# Patient Record
Sex: MALE | Race: WHITE
[De-identification: names, ages, dates, MRNs, and addresses within clinical notes are randomized per-mention and may not be internally consistent; named-entity substitution may affect disease eponyms.]

---

## 2019-07-07 ENCOUNTER — HOSPITAL ENCOUNTER (EMERGENCY)
Dept: HOSPITAL 92 - ERS | Age: 59
Discharge: ANOTHER HOSPITAL | End: 2019-07-07
Payer: SELF-PAY

## 2019-07-07 DIAGNOSIS — J96.00: Primary | ICD-10-CM

## 2019-07-07 DIAGNOSIS — I71.01: ICD-10-CM

## 2019-07-07 LAB
ALBUMIN SERPL BCG-MCNC: 3.8 G/DL (ref 3.5–5)
ALP SERPL-CCNC: 58 U/L (ref 40–150)
ALT SERPL W P-5'-P-CCNC: 14 U/L (ref 8–55)
ANALYZER IN CARDIO: (no result)
ANION GAP SERPL CALC-SCNC: 16 MMOL/L (ref 10–20)
AST SERPL-CCNC: 18 U/L (ref 5–34)
BACTERIA UR QL AUTO: (no result) HPF
BASE EXCESS STD BLDA CALC-SCNC: -2.4 MEQ/L
BASOPHILS # BLD AUTO: 0 THOU/UL (ref 0–0.2)
BASOPHILS NFR BLD AUTO: 0.5 % (ref 0–1)
BILIRUB SERPL-MCNC: 0.5 MG/DL (ref 0.2–1.2)
BUN SERPL-MCNC: 24 MG/DL (ref 8.4–25.7)
CA-I BLDA-SCNC: 1.12 MMOL/L (ref 1.12–1.3)
CALCIUM SERPL-MCNC: 8.9 MG/DL (ref 7.8–10.44)
CHLORIDE SERPL-SCNC: 107 MMOL/L (ref 98–107)
CO2 SERPL-SCNC: 21 MMOL/L (ref 22–29)
CREAT CL PREDICTED SERPL C-G-VRATE: 0 ML/MIN (ref 70–130)
DRUG SCREEN CUTOFF: (no result)
EOSINOPHIL # BLD AUTO: 0.6 THOU/UL (ref 0–0.7)
EOSINOPHIL NFR BLD AUTO: 8.3 % (ref 0–10)
GLOBULIN SER CALC-MCNC: 2.3 G/DL (ref 2.4–3.5)
GLUCOSE SERPL-MCNC: 94 MG/DL (ref 70–105)
GLUCOSE UR STRIP-MCNC: 50 MG/DL
HCO3 BLDA-SCNC: 23.1 MEQ/L (ref 22–28)
HCT VFR BLDA CALC: 39 % (ref 42–52)
HGB BLD-MCNC: 13.8 G/DL (ref 14–18)
HGB BLDA-MCNC: 13.2 G/DL (ref 14–18)
LYMPHOCYTES # BLD: 1.9 THOU/UL (ref 1.2–3.4)
LYMPHOCYTES NFR BLD AUTO: 24.4 % (ref 21–51)
MCH RBC QN AUTO: 29.8 PG (ref 27–31)
MCV RBC AUTO: 91.9 FL (ref 78–98)
MEDTOX CONTROL LINE VALID?: (no result)
MEDTOX READER #: (no result)
MONOCYTES # BLD AUTO: 0.6 THOU/UL (ref 0.11–0.59)
MONOCYTES NFR BLD AUTO: 8.2 % (ref 0–10)
NEUTROPHILS # BLD AUTO: 4.6 THOU/UL (ref 1.4–6.5)
NEUTROPHILS NFR BLD AUTO: 58.6 % (ref 42–75)
O2 A-A PPRESDIFF RESPIRATORY: 110.72 MM[HG] (ref 0–20)
PCO2 BLDA: 42.7 MMHG (ref 35–45)
PH BLDA: 7.35 [PH] (ref 7.35–7.45)
PLATELET # BLD AUTO: 120 THOU/UL (ref 130–400)
PO2 BLDA: 121.1 MMHG (ref 80–?)
POTASSIUM BLD-SCNC: 3.66 MMOL/L (ref 3.7–5.3)
POTASSIUM SERPL-SCNC: 4.2 MMOL/L (ref 3.5–5.1)
PROT UR STRIP.AUTO-MCNC: 300 MG/DL
RBC # BLD AUTO: 4.62 MILL/UL (ref 4.7–6.1)
SODIUM SERPL-SCNC: 140 MMOL/L (ref 136–145)
SPECIMEN DRAWN FROM PATIENT: (no result)
WBC # BLD AUTO: 7.8 THOU/UL (ref 4.8–10.8)

## 2019-07-07 PROCEDURE — 81003 URINALYSIS AUTO W/O SCOPE: CPT

## 2019-07-07 PROCEDURE — 36430 TRANSFUSION BLD/BLD COMPNT: CPT

## 2019-07-07 PROCEDURE — 81015 MICROSCOPIC EXAM OF URINE: CPT

## 2019-07-07 PROCEDURE — 96361 HYDRATE IV INFUSION ADD-ON: CPT

## 2019-07-07 PROCEDURE — 96366 THER/PROPH/DIAG IV INF ADDON: CPT

## 2019-07-07 PROCEDURE — 51702 INSERT TEMP BLADDER CATH: CPT

## 2019-07-07 PROCEDURE — 36556 INSERT NON-TUNNEL CV CATH: CPT

## 2019-07-07 PROCEDURE — 80306 DRUG TEST PRSMV INSTRMNT: CPT

## 2019-07-07 PROCEDURE — 82805 BLOOD GASES W/O2 SATURATION: CPT

## 2019-07-07 PROCEDURE — 36415 COLL VENOUS BLD VENIPUNCTURE: CPT

## 2019-07-07 PROCEDURE — 70450 CT HEAD/BRAIN W/O DYE: CPT

## 2019-07-07 PROCEDURE — 96374 THER/PROPH/DIAG INJ IV PUSH: CPT

## 2019-07-07 PROCEDURE — 96375 TX/PRO/DX INJ NEW DRUG ADDON: CPT

## 2019-07-07 PROCEDURE — P9016 RBC LEUKOCYTES REDUCED: HCPCS

## 2019-07-07 PROCEDURE — 84484 ASSAY OF TROPONIN QUANT: CPT

## 2019-07-07 PROCEDURE — 94002 VENT MGMT INPAT INIT DAY: CPT

## 2019-07-07 PROCEDURE — 71275 CT ANGIOGRAPHY CHEST: CPT

## 2019-07-07 PROCEDURE — 96365 THER/PROPH/DIAG IV INF INIT: CPT

## 2019-07-07 PROCEDURE — 99292 CRITICAL CARE ADDL 30 MIN: CPT

## 2019-07-07 PROCEDURE — 93005 ELECTROCARDIOGRAM TRACING: CPT

## 2019-07-07 PROCEDURE — 96368 THER/DIAG CONCURRENT INF: CPT

## 2019-07-07 PROCEDURE — 80053 COMPREHEN METABOLIC PANEL: CPT

## 2019-07-07 PROCEDURE — 86900 BLOOD TYPING SEROLOGIC ABO: CPT

## 2019-07-07 PROCEDURE — 86901 BLOOD TYPING SEROLOGIC RH(D): CPT

## 2019-07-07 PROCEDURE — 85025 COMPLETE CBC W/AUTO DIFF WBC: CPT

## 2019-07-07 PROCEDURE — 31500 INSERT EMERGENCY AIRWAY: CPT

## 2019-07-07 PROCEDURE — 87086 URINE CULTURE/COLONY COUNT: CPT

## 2019-07-07 PROCEDURE — 71045 X-RAY EXAM CHEST 1 VIEW: CPT

## 2019-07-07 PROCEDURE — 86850 RBC ANTIBODY SCREEN: CPT

## 2019-07-07 NOTE — CT
CT ANGIOGRAM THORAX WITH CONTRAST

CT ANGIOGRAM ABDOMEN WITH CONTRAST:



DATE:

7/7/2019



HISTORY:

58-year-old male found unresponsive and hypotensive.



Dr. Jaimes verbally gave this report to Dr. Brower's medical scribe Smiley Shonna by telephone at 4:01 P
M on 7/7/2019



TECHNIQUE:

IV injection of iodinated contrast.

Arterial bolus chasing technique.

Scan acquisition from top of aortic arch to iliac crests.

3-D MIP reconstructions.



FINDINGS:

There is a large alveolar infiltrate involving almost the entire right lower lobe consistent with asp
iration.

The ascending aorta is aneurysmally dilated in a fusiform manner measuring 7.2 cm in diameter.

Intimal flap begins at aortic root, and propagates through aortic arch, extending into the bilateral 
common carotid arteries and brachiocephalic artery, and left subclavian artery. There is thrombosis

and occlusion of the right common carotid artery at its origin.

The dissecting intimal flap extends throughout the entire abdominal aorta, extends deep into the supe
rior mesenteric artery. 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

distal to its origin. The intimal flap and dissection propagates into the right common iliac artery a
nd proximal right external iliac artery.

There is a smoothly well-circumscribed, nonlobulated 7 x 6 x 8 cm mass distorting the pancreatic tail
. It has density of 50 Hounsfield units. No splenomegaly. No hydronephrosis. No large renal

infarction. No retroperitoneal hematoma. There is fluid throughout the mediastinum which probably rep
resents hemorrhage/rupture. There is irregularity of the walls of the ascending aortic aneurysm

proximally, and the mediastinal fluid probably represents hematoma. There is a pericardial effusion, 
small, which is probably hemopericardium..



IMPRESSION:

1) Adrian type A, DeBakey type I aortic dissection.

2) ascending aortic aneurysm (dissecting aortic aneurysm).

3) probable rupture/leakage of ascending aortic dissecting aneurysm: Mediastinal hematoma and hemoper
icardium.

4) right lower lobe aspiration pneumonia.

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

6) 8 cm mass abutting the pancreatic tail. Exact etiology is uncertain, but this could represent a gi
ant aneurysm, perhaps arising from splenic artery, although exact artery of origin is uncertain.

7) the dissection propagates into the great vessels, and there is associated acute thrombosis and occ
lusion of the right common carotid artery.



Reported By: Vamshi Jaimes 

Electronically Signed:  7/7/2019 4:12 PM

## 2019-07-07 NOTE — CT
CT BRAIN NONCONTRAST:



DATE:

7/7/2019



TIME:

3:27 PM



HISTORY:

58-year-old male with altered mental status.



FINDINGS:

There is no evidence of acute intra-axial or extra-axial hemorrhage. There is no midline shift or any
 other mass effect. There is no extra-axial fluid collection. There is no evidence of obstructive

hydrocephalus. Calvarium is intact. There is at least partial loss of the gray matter-white matter di
stinction diffusely throughout the bilateral cerebral hemispheres. There is fluid filling the

nasopharyngeal airway.



IMPRESSION:



1. Dense left MCA sign suspicious for thrombus /thromboembolism in the M1 segment of the left middle 
cerebral artery.

2. No acute intracranial hemorrhage or mass effect.

3. Slight loss of gray-white junction, questionable for diffuse cytotoxic edema due to diffuse bilate
ral acute cerebral hemispheric global ischemia/infarction.



Reported By: Vamshi Jaimes 

Electronically Signed:  7/7/2019 3:53 PM

## 2019-07-07 NOTE — RAD
RADIOGRAPH CHEST 1 VIEW:



DATE:

7/7/2019



TIME:

3:08 PM



HISTORY:

58-year-old male in respiratory distress. Status post intubation.



COMPARISON:

none



FINDINGS:

Supine positioning makes this insensitive for pneumothorax detection. ETT distal tip overlies mid tho
racic trachea. NGT visualized, distal portion outside of field of view. Left lung base outside of

field of view. No consolidation or pulmonary edema in the rest of lung fields. Defibrillation paddle 
overlies right lung.



IMPRESSION:

1) status post intubation with endotracheal tube and esophagogastric tube.

2) ongoing cardioversion.



Reported By: Vamshi Jaimse 

Electronically Signed:  7/7/2019 3:24 PM